# Patient Record
Sex: FEMALE | URBAN - METROPOLITAN AREA
[De-identification: names, ages, dates, MRNs, and addresses within clinical notes are randomized per-mention and may not be internally consistent; named-entity substitution may affect disease eponyms.]

---

## 2020-06-09 ENCOUNTER — OFFICE VISIT (OUTPATIENT)
Dept: URBAN - METROPOLITAN AREA SURGERY CENTER 25 | Facility: SURGERY CENTER | Age: 75
End: 2020-06-09

## 2020-06-24 ENCOUNTER — OFFICE VISIT (OUTPATIENT)
Dept: URBAN - METROPOLITAN AREA CLINIC 113 | Facility: CLINIC | Age: 75
End: 2020-06-24

## 2020-07-20 ENCOUNTER — OFFICE VISIT (OUTPATIENT)
Dept: URBAN - METROPOLITAN AREA CLINIC 113 | Facility: CLINIC | Age: 75
End: 2020-07-20

## 2020-07-25 ENCOUNTER — TELEPHONE ENCOUNTER (OUTPATIENT)
Dept: URBAN - METROPOLITAN AREA CLINIC 13 | Facility: CLINIC | Age: 75
End: 2020-07-25

## 2020-07-25 RX ORDER — METRONIDAZOLE 500 MG/1
TAKE 1 TABLET 3 TIMES A DAY TABLET, FILM COATED ORAL
Qty: 21 | Refills: 0 | OUTPATIENT
End: 2020-06-09

## 2020-07-25 RX ORDER — POLYETHYLENE GLYCOL 3350, SODIUM CHLORIDE, SODIUM BICARBONATE AND POTASSIUM CHLORIDE WITH LEMON FLAVOR 420; 11.2; 5.72; 1.48 G/4L; G/4L; G/4L; G/4L
TAKE 1/2 GALLON AT 5:00 PM DAY BEFORE PROCEDURE, TAKE SECOND 1/2 OF GALLON 6 HRS PRIOR TO PROCEDURE POWDER, FOR SOLUTION ORAL
Qty: 1 | Refills: 0 | OUTPATIENT
Start: 2020-02-28 | End: 2020-06-09

## 2020-07-25 RX ORDER — TETRACYCLINE HYDROCHLORIDE 250 MG/1
TAKE 1 CAPSULE 4 TIMES DAILY CAPSULE ORAL
Refills: 0 | OUTPATIENT
End: 2020-06-09

## 2020-07-26 ENCOUNTER — TELEPHONE ENCOUNTER (OUTPATIENT)
Dept: URBAN - METROPOLITAN AREA CLINIC 13 | Facility: CLINIC | Age: 75
End: 2020-07-26

## 2020-07-26 RX ORDER — DULOXETINE HCL 20 MG
TAKE 1 CAPSULE TWICE DAILY CAPSULE,DELAYED RELEASE (ENTERIC COATED) ORAL
Refills: 0 | Status: ACTIVE | COMMUNITY

## 2020-07-26 RX ORDER — CALCIUM CARBONATE 500(1250)
TAKE 1 TABLET DAILY TABLET ORAL
Refills: 0 | Status: ACTIVE | COMMUNITY

## 2020-07-26 RX ORDER — DOXYCYCLINE HYCLATE 50 MG/1
CAPSULE ORAL
Qty: 30 | Refills: 0 | Status: ACTIVE | COMMUNITY
Start: 2020-02-20

## 2020-07-26 RX ORDER — TRIAMCINOLONE ACETONIDE 1 MG/G
CREAM TOPICAL
Qty: 80 | Refills: 0 | Status: ACTIVE | COMMUNITY
Start: 2020-02-20

## 2020-07-26 RX ORDER — METRONIDAZOLE 7.5 MG/G
APPLY CREAM EXTERNALLY TO AFFECTED AREA TWICE DAILY CREAM TOPICAL
Qty: 45 | Refills: 0 | Status: ACTIVE | COMMUNITY
Start: 2020-02-20

## 2020-09-21 ENCOUNTER — OFFICE VISIT (OUTPATIENT)
Dept: URBAN - METROPOLITAN AREA CLINIC 113 | Facility: CLINIC | Age: 75
End: 2020-09-21
Payer: MEDICARE

## 2020-09-21 ENCOUNTER — DASHBOARD ENCOUNTERS (OUTPATIENT)
Age: 75
End: 2020-09-21

## 2020-09-21 ENCOUNTER — WEB ENCOUNTER (OUTPATIENT)
Dept: URBAN - METROPOLITAN AREA CLINIC 113 | Facility: CLINIC | Age: 75
End: 2020-09-21

## 2020-09-21 VITALS
WEIGHT: 139 LBS | HEART RATE: 77 BPM | TEMPERATURE: 97.7 F | HEIGHT: 64 IN | BODY MASS INDEX: 23.73 KG/M2 | DIASTOLIC BLOOD PRESSURE: 80 MMHG | SYSTOLIC BLOOD PRESSURE: 158 MMHG

## 2020-09-21 DIAGNOSIS — K63.5 POLYP OF COLON, UNSPECIFIED PART OF COLON, UNSPECIFIED TYPE: ICD-10-CM

## 2020-09-21 DIAGNOSIS — K57.30 DIVERTICULOSIS LARGE INTESTINE W/O PERFORATION OR ABSCESS W/O BLEEDING: ICD-10-CM

## 2020-09-21 DIAGNOSIS — K59.01 SLOW TRANSIT CONSTIPATION: ICD-10-CM

## 2020-09-21 PROBLEM — 35298007: Status: ACTIVE | Noted: 2020-09-21

## 2020-09-21 PROBLEM — 724538004: Status: ACTIVE | Noted: 2020-09-21

## 2020-09-21 PROCEDURE — 99213 OFFICE O/P EST LOW 20 MIN: CPT | Performed by: INTERNAL MEDICINE

## 2020-09-21 PROCEDURE — 1036F TOBACCO NON-USER: CPT | Performed by: INTERNAL MEDICINE

## 2020-09-21 PROCEDURE — G8427 DOCREV CUR MEDS BY ELIG CLIN: HCPCS | Performed by: INTERNAL MEDICINE

## 2020-09-21 PROCEDURE — 3017F COLORECTAL CA SCREEN DOC REV: CPT | Performed by: INTERNAL MEDICINE

## 2020-09-21 RX ORDER — TRIAMCINOLONE ACETONIDE 1 MG/G
PRN CREAM TOPICAL
Refills: 0 | Status: ACTIVE | COMMUNITY
Start: 2020-02-20

## 2020-09-21 RX ORDER — DOXYCYCLINE HYCLATE 50 MG/1
CAPSULE ORAL
Qty: 30 | Refills: 0 | Status: ON HOLD | COMMUNITY
Start: 2020-02-20

## 2020-09-21 RX ORDER — CALCIUM CARBONATE 500(1250)
TAKE 1 TABLET DAILY TABLET ORAL
Refills: 0 | Status: ACTIVE | COMMUNITY

## 2020-09-21 RX ORDER — POLYETHYLENE GLYCOL 3350, SODIUM SULFATE, SODIUM CHLORIDE, POTASSIUM CHLORIDE, ASCORBIC ACID, SODIUM ASCORBATE 140-9-5.2G
AS DIRECTED KIT ORAL ONCE
Qty: 1 LITER | Refills: 0 | OUTPATIENT
Start: 2020-09-21 | End: 2020-09-22

## 2020-09-21 RX ORDER — DULOXETINE HCL 20 MG
TAKE 1 CAPSULE TWICE DAILY CAPSULE,DELAYED RELEASE (ENTERIC COATED) ORAL
Refills: 0 | Status: ACTIVE | COMMUNITY

## 2020-09-21 RX ORDER — METRONIDAZOLE 7.5 MG/G
APPLY CREAM EXTERNALLY TO AFFECTED AREA TWICE DAILY CREAM TOPICAL
Qty: 45 | Refills: 0 | Status: ACTIVE | COMMUNITY
Start: 2020-02-20

## 2020-09-21 NOTE — HPI-TODAY'S VISIT:
Very nice 74-year-old who has a history of colon polyps.  She underwent a surveillance colonoscopy examination on June 9, 2020 that was aborted and was incomplete.  She had left colonic diverticulosis, there was stool and debris in the colon could not get beyond the descending colon with a pediatric colonoscope an upper scope was used to get to the splenic flexure.  The transverse and cecum and ascending colon was not visualized on that study. Overall she has been doing relatively well.  She has a bowel movement about every day there is been no blood or melena.  No abdominal pain no fever no weight loss no nausea or vomiting.  She denies any heartburn or dysphagia.

## 2020-10-05 ENCOUNTER — TELEPHONE ENCOUNTER (OUTPATIENT)
Dept: URBAN - METROPOLITAN AREA CLINIC 113 | Facility: CLINIC | Age: 75
End: 2020-10-05

## 2020-11-04 ENCOUNTER — OFFICE VISIT (OUTPATIENT)
Dept: URBAN - METROPOLITAN AREA CLINIC 113 | Facility: CLINIC | Age: 75
End: 2020-11-04

## 2023-10-05 ENCOUNTER — LAB OUTSIDE AN ENCOUNTER (OUTPATIENT)
Dept: URBAN - METROPOLITAN AREA CLINIC 113 | Facility: CLINIC | Age: 78
End: 2023-10-05

## 2024-05-31 ENCOUNTER — OFFICE VISIT (OUTPATIENT)
Dept: URBAN - METROPOLITAN AREA CLINIC 113 | Facility: CLINIC | Age: 79
End: 2024-05-31
Payer: MEDICARE

## 2024-05-31 VITALS
DIASTOLIC BLOOD PRESSURE: 96 MMHG | HEIGHT: 64 IN | WEIGHT: 141 LBS | BODY MASS INDEX: 24.07 KG/M2 | HEART RATE: 56 BPM | SYSTOLIC BLOOD PRESSURE: 160 MMHG | TEMPERATURE: 97.9 F | RESPIRATION RATE: 20 BRPM

## 2024-05-31 DIAGNOSIS — K63.5 POLYP OF COLON, UNSPECIFIED PART OF COLON, UNSPECIFIED TYPE: ICD-10-CM

## 2024-05-31 DIAGNOSIS — K59.01 SLOW TRANSIT CONSTIPATION: ICD-10-CM

## 2024-05-31 DIAGNOSIS — K57.30 DIVERTICULOSIS LARGE INTESTINE W/O PERFORATION OR ABSCESS W/O BLEEDING: ICD-10-CM

## 2024-05-31 PROCEDURE — 99203 OFFICE O/P NEW LOW 30 MIN: CPT | Performed by: INTERNAL MEDICINE

## 2024-05-31 RX ORDER — TRIAMCINOLONE ACETONIDE 1 MG/G
PRN CREAM TOPICAL
Refills: 0 | Status: ON HOLD | COMMUNITY
Start: 2020-02-20

## 2024-05-31 RX ORDER — CALCIUM CARBONATE 500(1250)
TAKE 1 TABLET DAILY TABLET ORAL
Refills: 0 | Status: ACTIVE | COMMUNITY

## 2024-05-31 RX ORDER — DENOSUMAB 60 MG/ML
INJECT INJECTION SUBCUTANEOUS
Status: ACTIVE | COMMUNITY

## 2024-05-31 RX ORDER — DOXYCYCLINE HYCLATE 50 MG/1
CAPSULE ORAL
Qty: 30 | Refills: 0 | Status: ON HOLD | COMMUNITY
Start: 2020-02-20

## 2024-05-31 RX ORDER — METRONIDAZOLE 7.5 MG/G
APPLY CREAM EXTERNALLY TO AFFECTED AREA TWICE DAILY CREAM TOPICAL
Qty: 45 | Refills: 0 | Status: ACTIVE | COMMUNITY
Start: 2020-02-20

## 2024-05-31 RX ORDER — DULOXETINE HCL 20 MG
TAKE 1 CAPSULE TWICE DAILY CAPSULE,DELAYED RELEASE (ENTERIC COATED) ORAL
Refills: 0 | Status: ACTIVE | COMMUNITY

## 2024-12-12 ENCOUNTER — OFFICE VISIT (OUTPATIENT)
Dept: URBAN - METROPOLITAN AREA CLINIC 113 | Facility: CLINIC | Age: 79
End: 2024-12-12
Payer: MEDICARE

## 2024-12-12 ENCOUNTER — LAB OUTSIDE AN ENCOUNTER (OUTPATIENT)
Dept: URBAN - METROPOLITAN AREA CLINIC 113 | Facility: CLINIC | Age: 79
End: 2024-12-12

## 2024-12-12 VITALS
DIASTOLIC BLOOD PRESSURE: 105 MMHG | HEART RATE: 59 BPM | HEIGHT: 64 IN | RESPIRATION RATE: 18 BRPM | SYSTOLIC BLOOD PRESSURE: 171 MMHG | BODY MASS INDEX: 24.17 KG/M2 | TEMPERATURE: 97.3 F | WEIGHT: 141.6 LBS

## 2024-12-12 DIAGNOSIS — K57.30 DIVERTICULOSIS LARGE INTESTINE W/O PERFORATION OR ABSCESS W/O BLEEDING: ICD-10-CM

## 2024-12-12 DIAGNOSIS — K59.01 SLOW TRANSIT CONSTIPATION: ICD-10-CM

## 2024-12-12 DIAGNOSIS — K63.5 POLYP OF COLON, UNSPECIFIED PART OF COLON, UNSPECIFIED TYPE: ICD-10-CM

## 2024-12-12 PROCEDURE — 99213 OFFICE O/P EST LOW 20 MIN: CPT | Performed by: INTERNAL MEDICINE

## 2024-12-12 RX ORDER — POLYETHYLENE GLYCOL 3350, SODIUM CHLORIDE, SODIUM BICARBONATE, POTASSIUM CHLORIDE 420; 11.2; 5.72; 1.48 G/4L; G/4L; G/4L; G/4L
AS DIRECTED POWDER, FOR SOLUTION ORAL ONCE
Qty: 420 | Refills: 0 | OUTPATIENT
Start: 2024-12-12 | End: 2024-12-13

## 2024-12-12 RX ORDER — DULOXETINE HCL 20 MG
TAKE 1 CAPSULE TWICE DAILY CAPSULE,DELAYED RELEASE (ENTERIC COATED) ORAL
Refills: 0 | Status: ACTIVE | COMMUNITY

## 2024-12-12 RX ORDER — CALCIUM CARBONATE 500(1250)
TAKE 1 TABLET DAILY TABLET ORAL
Refills: 0 | Status: ACTIVE | COMMUNITY

## 2024-12-12 RX ORDER — MULTIVITAMIN
TAKE 1 TABLET DAILY TABLET ORAL
Refills: 0 | Status: ACTIVE | COMMUNITY

## 2024-12-12 RX ORDER — DOXYCYCLINE HYCLATE 50 MG/1
CAPSULE ORAL
Qty: 30 | Refills: 0 | Status: ON HOLD | COMMUNITY
Start: 2020-02-20

## 2024-12-12 RX ORDER — METRONIDAZOLE 7.5 MG/G
APPLY CREAM EXTERNALLY TO AFFECTED AREA TWICE DAILY CREAM TOPICAL
Qty: 45 | Refills: 0 | Status: ACTIVE | COMMUNITY
Start: 2020-02-20

## 2024-12-12 RX ORDER — DENOSUMAB 60 MG/ML
INJECT INJECTION SUBCUTANEOUS
Status: ACTIVE | COMMUNITY

## 2024-12-12 RX ORDER — TRIAMCINOLONE ACETONIDE 1 MG/G
PRN CREAM TOPICAL
Refills: 0 | Status: ON HOLD | COMMUNITY
Start: 2020-02-20

## 2024-12-12 NOTE — HPI-OTHER HISTORIES
CT: Colonography/virtual colonoscopy on 10/5/2020 revealed a few small regions of adherent stool versus small polyps in the right colon 7 mm in size.  Colonoscopy examination on 6/9/2020 that was aborted and was incomplete. She had left colonic diverticulosis, there was stool and debris in the colon could not get beyond the descending colon with a pediatric colonoscope an upper scope was used to get to the splenic flexure. The transverse and cecum and ascending colon was not visualized on that study.

## 2024-12-12 NOTE — HPI-TODAY'S VISIT:
Very nice 79-year-old who has a history of colon polyps.  She underwent a surveillance colonoscopy examination on June 9, 2020 that was aborted and was incomplete.  There went a virtual colonoscopy/CT colonography on 10/5/2020 that revealed a few small regions of adherent stool versus small polyps in the right colon about 7 mm in size.  She returns for consideration of colon evaluation.  She is doing very well and has had no new medical or surgical issues.  She moves her bowels every other day there is been no straining no bright red blood per rectum or any melena.  She denies any nausea or vomiting or abdominal pain.  She denies heartburn or dysphagia.  Overall she is feeling well.  Her father's sister's (patient's aunt) had colon cancer.   Blood work on 2/20/2024 revealed a hemoglobin of 14.6, WBC of 4.8 and platelet count 254,000.  Sodium 141 potassium 4.4 BUN 12 creatinine 0.63.  AST 33, ALT 27, alk phosphatase 55, total bili 0.6.  TSH is 4.75.

## 2025-02-11 ENCOUNTER — OFFICE VISIT (OUTPATIENT)
Dept: URBAN - METROPOLITAN AREA SURGERY CENTER 25 | Facility: SURGERY CENTER | Age: 80
End: 2025-02-11

## 2025-02-11 ENCOUNTER — CLAIMS CREATED FROM THE CLAIM WINDOW (OUTPATIENT)
Dept: URBAN - METROPOLITAN AREA SURGERY CENTER 25 | Facility: SURGERY CENTER | Age: 80
End: 2025-02-11
Payer: MEDICARE

## 2025-02-11 DIAGNOSIS — Z12.11 COLON CANCER SCREENING (HIGH RISK): ICD-10-CM

## 2025-02-11 DIAGNOSIS — K57.30 COLON, DIVERTICULOSIS: ICD-10-CM

## 2025-02-11 PROCEDURE — 00812 ANES LWR INTST SCR COLSC: CPT | Performed by: NURSE ANESTHETIST, CERTIFIED REGISTERED

## 2025-02-11 RX ORDER — TRIAMCINOLONE ACETONIDE 1 MG/G
PRN CREAM TOPICAL
Refills: 0 | Status: ON HOLD | COMMUNITY
Start: 2020-02-20

## 2025-02-11 RX ORDER — MULTIVITAMIN
TAKE 1 TABLET DAILY TABLET ORAL
Refills: 0 | Status: ACTIVE | COMMUNITY

## 2025-02-11 RX ORDER — METRONIDAZOLE 7.5 MG/G
APPLY CREAM EXTERNALLY TO AFFECTED AREA TWICE DAILY CREAM TOPICAL
Qty: 45 | Refills: 0 | Status: ACTIVE | COMMUNITY
Start: 2020-02-20

## 2025-02-11 RX ORDER — DOXYCYCLINE HYCLATE 50 MG/1
CAPSULE ORAL
Qty: 30 | Refills: 0 | Status: ON HOLD | COMMUNITY
Start: 2020-02-20

## 2025-02-11 RX ORDER — DENOSUMAB 60 MG/ML
INJECT INJECTION SUBCUTANEOUS
Status: ACTIVE | COMMUNITY

## 2025-02-11 RX ORDER — DULOXETINE HYDROCHLORIDE 20 MG/1
TAKE 1 CAPSULE TWICE DAILY CAPSULE, DELAYED RELEASE ORAL
Refills: 0 | Status: ACTIVE | COMMUNITY

## 2025-02-11 RX ORDER — CALCIUM CARBONATE 500(1250)
TAKE 1 TABLET DAILY TABLET ORAL
Refills: 0 | Status: ACTIVE | COMMUNITY

## 2025-03-12 PROBLEM — 236069009: Status: ACTIVE | Noted: 2025-03-12

## 2025-03-13 ENCOUNTER — OFFICE VISIT (OUTPATIENT)
Dept: URBAN - METROPOLITAN AREA CLINIC 113 | Facility: CLINIC | Age: 80
End: 2025-03-13
Payer: MEDICARE

## 2025-03-13 VITALS
BODY MASS INDEX: 24.07 KG/M2 | DIASTOLIC BLOOD PRESSURE: 93 MMHG | HEIGHT: 64 IN | HEART RATE: 56 BPM | TEMPERATURE: 97.7 F | RESPIRATION RATE: 18 BRPM | SYSTOLIC BLOOD PRESSURE: 162 MMHG | WEIGHT: 141 LBS

## 2025-03-13 DIAGNOSIS — K57.30 DIVERTICULOSIS LARGE INTESTINE W/O PERFORATION OR ABSCESS W/O BLEEDING: ICD-10-CM

## 2025-03-13 DIAGNOSIS — K59.09 CHRONIC CONSTIPATION: ICD-10-CM

## 2025-03-13 PROCEDURE — 99212 OFFICE O/P EST SF 10 MIN: CPT | Performed by: INTERNAL MEDICINE

## 2025-03-13 RX ORDER — DULOXETINE HYDROCHLORIDE 20 MG/1
TAKE 1 CAPSULE TWICE DAILY CAPSULE, DELAYED RELEASE ORAL
Refills: 0 | Status: ACTIVE | COMMUNITY

## 2025-03-13 RX ORDER — DENOSUMAB 60 MG/ML
INJECT INJECTION SUBCUTANEOUS
Status: ACTIVE | COMMUNITY

## 2025-03-13 RX ORDER — CALCIUM CARBONATE 500(1250)
TAKE 1 TABLET DAILY TABLET ORAL
Refills: 0 | Status: ACTIVE | COMMUNITY

## 2025-03-13 RX ORDER — METRONIDAZOLE 7.5 MG/G
APPLY CREAM EXTERNALLY TO AFFECTED AREA TWICE DAILY CREAM TOPICAL
Qty: 45 | Refills: 0 | Status: ACTIVE | COMMUNITY
Start: 2020-02-20

## 2025-03-13 RX ORDER — MULTIVITAMIN
TAKE 1 TABLET DAILY TABLET ORAL
Refills: 0 | Status: ACTIVE | COMMUNITY

## 2025-03-13 RX ORDER — DOXYCYCLINE HYCLATE 50 MG/1
CAPSULE ORAL
Qty: 30 | Refills: 0 | Status: ON HOLD | COMMUNITY
Start: 2020-02-20

## 2025-03-13 RX ORDER — TRIAMCINOLONE ACETONIDE 1 MG/G
PRN CREAM TOPICAL
Refills: 0 | Status: ON HOLD | COMMUNITY
Start: 2020-02-20

## 2025-03-13 NOTE — HPI-TODAY'S VISIT:
Very nice 79-year-old who presents for follow-up after colonoscopy.  She is doing very well and has no GI complaints today.  She is moving her bowels every day there is been no blood or melena.  She has no abdominal pain or nausea or vomiting.  There is been no heartburn or dysphagia.  Her father's sister (patient's aunt) had colon cancer.   Blood work on 2/20/2024 revealed a hemoglobin of 14.6, WBC of 4.8 and platelet count 254,000.  Sodium 141 potassium 4.4 BUN 12 creatinine 0.63.  AST 33, ALT 27, alk phosphatase 55, total bili 0.6.  TSH is 4.75.

## 2025-03-13 NOTE — PHYSICAL EXAM CONSTITUTIONAL:
well developed, well nourished , in no acute distress , ambulating without difficulty , normal communication ability
Female

## 2025-03-13 NOTE — HPI-OTHER HISTORIES
Colonoscopy on 2/11/2025 revealed a normal terminal ileum, significantly tortuous left colon.  There are few medium sized left colonic diverticuli.  There were no colon polyps.  CT: Colonography/virtual colonoscopy on 10/5/2020 revealed a few small regions of adherent stool versus small polyps in the right colon 7 mm in size.  Colonoscopy examination on 6/9/2020 that was aborted and was incomplete. She had left colonic diverticulosis, there was stool and debris in the colon could not get beyond the descending colon with a pediatric colonoscope an upper scope was used to get to the splenic flexure. The transverse and cecum and ascending colon was not visualized on that study.